# Patient Record
(demographics unavailable — no encounter records)

---

## 2025-06-11 NOTE — PHYSICAL EXAM
[Well Developed] : well developed [NAD] : in no acute distress [icteric] : anicteric [CTAB] : lungs clear to auscultation bilaterally [Moist & Pink Mucous Membranes] : moist and pink mucous membranes [Respiratory Distress] : no respiratory distress  [Regular Rate and Rhythm] : regular rate and rhythm [Soft] : soft  [Normal S1, S2] : normal S1 and S2 [Distended] : non distended [Tender] : non tender [Normal Bowel Sounds] : normal bowel sounds [Normal Tone] : normal tone [No HSM] : no hepatosplenomegaly appreciated [Edema] : no edema [Well-Perfused] : well-perfused [Cyanosis] : no cyanosis [Rash] : no rash [Jaundice] : no jaundice [Interactive] : interactive

## 2025-06-11 NOTE — ASSESSMENT
[FreeTextEntry1] : Ki is a 8 month old infant female who presents with her parents for evaluation of possible milk protein allergy. She started having profuse diarrhea after starting solids. Calprotectin has trended down to normal now. She is gaining weight. Family wants to try to go back to Similac sensitive.  Advise we can trial gradual transition back. If any diarrhea- hold off the switch Bring dirty diaper in 2 weeks for occult testing If occult negative, may advance to yogurt/cheese follow up in 8 weeks or sooner if needed

## 2025-06-11 NOTE — HISTORY OF PRESENT ILLNESS
[de-identified] : Ki is a 8 month old infant female who presents for f/u of diarrhea.  Approximately about four months ago, the parents began introducing solid foods, starting with cereal, then bananas, sweet potatoes, and pears. Shortly after, Ki developed severe diarrhea and a rash in the diaper area. The parents discontinued solid foods and switched from Similac Sensitive formula to Similac Alimentum. Since then, the frequency of bowel movements has decreased, and the diaper rash resolved. Recent viral testing was reportedly negative. Stool calprotectin was elevated and now normalized. She is tolerating variety of solids and gaining weight.   - Past Medical History: Born full-term via  section. No complications requiring NICU stay. Birth weight was 6 pounds 14 ounces, with some initial weight loss noted.   - Past Surgical History:  section delivery   - Family History: none   - Social History:     - Living with both parents    - Medications:     - Similac Alimentum formula (recently switched from Similac Sensitive)   - Allergies:     - Possible food intolerances or allergies (manifested as diarrhea and rash after introduction of solid foods)   Objective:   - Diagnostic Results: Recent viral testing reported as negative. Stool tests were performed, results negative for infection. Calprotectin elevated to 475.

## 2025-06-11 NOTE — CONSULT LETTER
[Dear  ___] : Dear  [unfilled], [Consult Letter:] : I had the pleasure of evaluating your patient, [unfilled]. [Consult Closing:] : Thank you very much for allowing me to participate in the care of this patient.  If you have any questions, please do not hesitate to contact me. [Please see my note below.] : Please see my note below. [FreeTextEntry3] : Sincerely,  Katlyn He MD Pediatric Gastroenterology  [facility]

## 2025-07-15 NOTE — PHYSICAL EXAM
[FreeTextEntry1] : Alert, NAD. HC 45 cm. Heart sounds NL. Neck FROM. Back NL. Abdomen soft, no masses. PERRL, EOMI, face symmetric, hearing grossly intact. Tone, power, sensation, DTRs NL. No nystagmus or tremor.

## 2025-07-15 NOTE — DISCUSSION/SUMMARY
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 Will get EEG. RTO prn. Rx written for chloral hydrate 700 mg with 1 refill. Note sent to Dr Bolanos(PCP). Total clinician time spent on 7/15/25 is 48 minutes including preparing to see the patient, obtaining and/or reviewing and confirming history, performing a medically necessary and appropriate examination, counseling and educating the patient and/or family, documenting clinical information in the EHR and communicating and/or referring to other healthcare professionals.

## 2025-07-15 NOTE — CONSULT LETTER
[Dear  ___] : Dear  [unfilled], [Please see my note below.] : Please see my note below. [Sincerely,] : Sincerely, [FreeTextEntry1] : Thank you for sending  YO TYLER  to me for neurological evaluation. This is an initial encounter with a new pt.  [FreeTextEntry3] : Dr Auguste

## 2025-07-15 NOTE — HISTORY OF PRESENT ILLNESS
[FreeTextEntry1] : 10 month old female with a 2 week hx of involuntary bilateral UE extensor posturing movements. Happens while awake and in sitting position. Happens as a singular event or sometimes a few in a row. No head dipping, eye deviation or clonic movements noted. No vomiting, altered behavior or lethargy afterwards. Overall frequency has been sporadic. PMH -ve. On no meds. NKA. Birth; FT C/S no complications. Pt smiles, laughs, fixates, tracks, hears well, reaches equally, babbles, rolls over, sits up, pulls to stand and cruises. FMH -ve for epilepsy. MGF had a brain tumor with seizures.

## 2025-07-23 NOTE — HISTORY OF PRESENT ILLNESS
[Mother] : mother [Formula ___ oz/feed] : [unfilled] oz of formula per feed [Hours between feeds ___] : Child is fed every [unfilled] hours [Formula ___ oz in 24 hrs] : [unfilled] oz of formula in 24 hours [Fruit] : fruit [Vegetables] : vegetables [Cereal] : cereal [Meat] : meat [Eggs] : eggs [Dairy] : dairy [Baby food] : baby food [Finger foods] : finger foods [Water] : water [Normal] : Normal [___ voids per day] : [unfilled] voids per day [Frequency of stools: ___] : Frequency of stools: [unfilled]  stools [per day] : per day. [In Crib] : sleeps in crib [Sleeps 12-16 hours per 24 hours (including naps)] : sleeps 12-16 hours per 24 hours (including naps) [Sippy Cup use] : sippy cup use [Brushing teeth] : brushing teeth [Toothpaste] : Primary Fluoride Source: Toothpaste [Screen time only for video chatting] : screen time only for video chatting [No] : No exposure to electronic nicotine device [Water heater temperature set at <120 degrees F] : Water heater temperature set at <120 degrees F [Rear facing car seat in  back seat] : Rear facing car seat in  back seat [Carbon Monoxide Detectors] : Carbon monoxide detectors [Smoke Detectors] : Smoke detectors [Up to date] : Up to date [NO] : No [Vitamin ___] : no vitamins [Fish] : no fish [Peanut] : no peanut [On back] : does not sleep on back [Co-sleeping] : no co-sleeping [Wakes up at night] : does not wake up at night [Loose bedding, pillow, toys, and/or bumpers in crib] : no loose bedding, pillow, toys, and/or bumpers in crib [Pacifier use] : not using pacifier [Bottle in bed] : not using bottle in bed [FreeTextEntry7] : going for EEG of the brain next week  [de-identified] : startles in high chair

## 2025-07-23 NOTE — PHYSICAL EXAM
[Alert] : alert [Normocephalic] : normocephalic [Normally Placed Ears] : normally placed ears [Auricles Well Formed] : auricles well formed [Clear Tympanic membranes] : clear tympanic membranes [Light reflex present] : light reflex present [Bony landmarks visible] : bony landmarks visible [Nares Patent] : nares patent [Palate Intact] : palate intact [Uvula Midline] : uvula midline [Supple, full passive range of motion] : supple, full passive range of motion [Symmetric Chest Rise] : symmetric chest rise [Clear to Auscultation Bilaterally] : clear to auscultation bilaterally [Soft] : soft [Normal External Genitalia] : normal external genitalia [No Abnormal Lymph Nodes Palpated] : no abnormal lymph nodes palpated [Straight] : straight [Acute Distress] : no acute distress [Excessive Tearing] : no excessive tearing [Discharge] : no discharge [Palpable Masses] : no palpable masses [Tender] : nontender [Distended] : nondistended [Rash or Lesions] : no rash/lesions

## 2025-07-23 NOTE — DISCUSSION/SUMMARY
[Normal Growth] : growth [Normal Development] : development [FreeTextEntry1] : Gave Heb B and Prevnar for 8 month well catch up.  Will return to office prior to 12 month well for Polio.  Next well 12 months.   Seen by Dr. Auguste for tensing of arms while in high chair. Per mom, Dr. Auguste believes these movements are WNL, but are performing an EEG next week to rule out seizure activity.

## 2025-07-23 NOTE — DEVELOPMENTAL MILESTONES
[None] : none [Uses basic gestures] : uses basic gestures [Says "Martín" or "Mama"] : says "Martín" or "Mama" nonspecifically [Sits well without support] : sits well without support [Transitions between sitting and lying] : transitions between sitting and lying [Balances on hands and knees] : balances on hands and knees [Crawls] : crawls [Releases objects intentionally] : releases objects intentionally [[place] objects together] : bangs objects together [Picks up small objects with 3 fingers] : does not  small objects with 3 fingers and thumb